# Patient Record
(demographics unavailable — no encounter records)

---

## 2017-12-14 NOTE — ED PDOC
HPI: Female  Pain


Chief Complaint (Provider): vaginal bleeding


History Per: Patient


History/Exam Limitations: no limitations


Onset/Duration Of Symptoms: Hrs


Severity: Mild


Quality Of Discomfort: Cramping


Associated Symptoms: Other (pelvic cramps, mild)


Additional Complaint(s): 





26 y/o F with no PMHx presents c/o spotting vaginal bleeding that started today

, mild and suspected pregnancy after 3 home pregnancy test positive and LMP . C/o mild pelvic cramps. Denies trauma, falls. Admits sexual intercourse 

last night and nausea since for the past month. Denies dysuria, frequency, 

diarrhea. Afebrile


Abnormal Vaginal Bleeding: Yes


Last Menstral Period: 17


: 4


Para: 2


Miscarriage: 1





<Abhi Rosales - Last Filed: 17 18:59>





<Stevie Escalera III - Last Filed: 17 14:29>


Chief Complaint (Nursing): Female Genitourinary





Supervising Attending Note





- Attestation:


I have personally seen and examined this patient.: Yes


I have fully participated in the care of the patient.: Yes





<Stevie Escalera III - Last Filed: 17 14:29>





Past Medical History


Reviewed: Nursing Documentation, Vital Signs


Vital Signs: 





 Last Vital Signs











Temp  98.8 F   17 17:29


 


Pulse  96 H  17 17:29


 


Resp  16   17 17:29


 


BP  131/79   17 17:29


 


Pulse Ox  100   17 17:29














- Medical History


PMH: No Chronic Diseases





- Surgical History


Surgical History: Hernia Repair (Umbilical 17 y/a)





- Family History


Family History: States: Unknown Family Hx





- Social History


Current smoker - smoking cessation education provided: No


Alcohol: Occasional


Drugs: Denies





<Abhi Rosales - Last Filed: 17 18:59>


Vital Signs: 





 Last Vital Signs











Temp  98.2 F   17 21:03


 


Pulse  70   17 21:03


 


Resp  18   17 21:03


 


BP  128/78   17 21:03


 


Pulse Ox  100   12/15/17 04:20














<Stevie Escalera III - Last Filed: 17 14:29>





- Allergies


Allergies/Adverse Reactions: 


 Allergies











Allergy/AdvReac Type Severity Reaction Status Date / Time


 


No Known Allergies Allergy   Unverified 13 16:42














Review of Systems


ROS Statement: Except As Marked, All Systems Reviewed And Found Negative


Gastrointestinal: Positive for: Nausea


Genitourinary Female: Positive for: Vaginal Bleeding, Pelvic Pain





<Abhi Rosales - Last Filed: 17 18:59>





Physical Exam





- Reviewed


Vital Signs Reviewed: Yes





- Physical Exam


Appears: Positive for: Non-toxic, No Acute Distress


Skin: Positive for: Normal Color, Warm


Eye Exam: Positive for: Normal appearance, EOMI, PERRL


Cardiovascular/Chest: Positive for: Regular Rate, Rhythm.  Negative for: Gallop

, Murmur


Respiratory: Positive for: Normal Breath Sounds.  Negative for: Crackles, Rales

, Wheezing


Gastrointestinal/Abdominal: Positive for: Soft.  Negative for: Tenderness, 

Distended, Guarding, Rebound


Back: Negative for: L CVA Tenderness, R CVA Tenderness


Extremity: Positive for: Normal ROM.  Negative for: Pedal Edema


Neurologic/Psych: Positive for: Alert, Oriented.  Negative for: Motor/Sensory 

Deficits





<Abhi Rosales - Last Filed: 17 18:59>





- Laboratory Results


Result Diagrams: 


 17 18:00





 17 18:00





- ECG


O2 Sat by Pulse Oximetry: 100





<Abhi Rosales - Last Filed: 17 18:59>





- Laboratory Results


Result Diagrams: 


 17 18:00





 17 18:00





<Stevie Escalera III - Last Filed: 17 14:29>





Medical Decision Making


Medical Decision Makin y/o with missed menstrual period and VB.


Confirm pregnancy


R/O threatened 


F/U BHCG, T&S, CBC, CMP


TV US if pregnancy +.








Upreg+. OB TV US ordered.





<Abhi Rosales - Last Filed: 17 18:59>





Disposition





- Disposition


Disposition Time: 19:00


Patient Signed Over To: Ba Mckeon (Pending TV US)





<Abhi Rosales - Last Filed: 17 18:59>





- Patient ED Disposition


Is Patient to be Admitted: Transfer of Care





<Stevie Escalera III - Last Filed: 17 14:29>





- Clinical Impression


Clinical Impression: 


 Vaginal bleeding affecting early pregnancy








- Disposition


Referrals: 


[facility] [[facility]]


Reno Cary MD [Family Provider] - 


Condition: STABLE


Instructions:  Threatened Miscarriage (ED)


Forms:  [facility] (English)

## 2017-12-14 NOTE — ED PDOC
- Laboratory Results


Result Diagrams: 


 17 18:00





 17 18:00





- ECG


O2 Sat by Pulse Oximetry: 100 (RA)


Pulse Ox Interpretation: Normal





Medical Decision Making


Medical Decision Makin:00


Patient signed out to me from Dr. Escalera. Pending reevaluation. 





20:51


Patient is stable for discharge.  US shows IUP w/ +FM.  Instructed patient to 

have complete pelvic rest until re-eval by OB.  Return precautions discussed.


--------------------------------------------------------------------------------

-----------------


Scribe Attestation:


Documented by Kelsea Billingsley, acting as a scribe for Ba Mcekon MD.





Provider Scribe Attestation:


All medical record entries made by the Scribe were at my direction and 

personally dictated by me. I have reviewed the chart and agree that the record 

accurately reflects my personal performance of the history, physical exam, 

medical decision making, and the department course for this patient. I have 

also personally directed, reviewed, and agree with the discharge instructions 

and disposition.





Disposition





- Clinical Impression


Clinical Impression: 


 Vaginal bleeding affecting early pregnancy








- POA


Present On Arrival: None





- Disposition


Referrals: 


[facility] [[facility]]


Reno Cary MD [Family Provider] - 


Disposition: Routine/Home


Disposition Time: 20:50


Condition: STABLE


Instructions:  Threatened Miscarriage (ED)


Forms:  [facility] (English)

## 2017-12-15 NOTE — US
HISTORY:

pregnant, spotting



COMPARISON:

None available.



TECHNIQUE:

Transvaginal pelvic ultrasound was performed with longitudinal and 

transverse images submitted for interpretation.



FINDINGS:



UTERUS:

Measures 6.5 x 5.7 x 4.9 cm. Uterus is retroverted and borderline 

enlarged without focal myometrial lesion appreciable. No fibroid or 

other mass lesion seen, however, there appears to be a sub septate or 

potentially septate uterus.



ENDOMETRIUM:

A single intrauterine gestation is identified within the endometrial 

cavity with minimal extension to the left-sided segment of what 

appears to be a subseptate uterus.  Yolk sac and pole are identified 

with the an MMA not clearly identified at this time. No definite 

acute separate hemorrhage is appreciated however the decidual 

reaction appears somewhat inhomogeneous in overall echogenicity. 

Fetal cardiac activity is recorded at approximately 180 beats per 

minute. Mean crown-rump length measurement is 0.36 cm correspond 6 

weeks 0 days estimated gestational age which agrees with menstrual 

dates of 6 weeks 1 day based on prior LMP 11/01/2017. Estimated date 

of delivery is 08/07/2018. 



CERVIX:

Closed internal cervical os is appreciable the cervix measuring 3.7 

cm.



RIGHT OVARY:

Measures three-point 2 x 2.5 x 1.6 cm. No solid mass. Normal flow. 



LEFT OVARY:

Measures 2.3 x 2.1 x 1.1 cm. No solid mass. Normal flow. 



FREE FLUID:

No significant free fluid noted.



OTHER FINDINGS:

None. 



IMPRESSION:

A single viable intrauterine gestation is identified with cardiac 

activity of pressure 180 beats per minute and ultrasonic age of 6 

weeks 0 days which agrees with LMP derived dates as discussed above. 

No definite decidual hemorrhage appreciable. Clinical correlation is 

advised.  Subtle follow-up is available if clinically warranted.